# Patient Record
Sex: MALE | Race: WHITE | Employment: OTHER | URBAN - METROPOLITAN AREA
[De-identification: names, ages, dates, MRNs, and addresses within clinical notes are randomized per-mention and may not be internally consistent; named-entity substitution may affect disease eponyms.]

---

## 2021-06-02 ENCOUNTER — HOSPITAL ENCOUNTER (OUTPATIENT)
Age: 71
Discharge: HOME | End: 2021-06-04
Payer: MEDICARE

## 2021-06-02 DIAGNOSIS — Z01.818 PREOP TESTING: ICD-10-CM

## 2021-06-02 PROCEDURE — U0005 INFEC AGEN DETEC AMPLI PROBE: HCPCS

## 2021-06-02 PROCEDURE — U0003 INFECTIOUS AGENT DETECTION BY NUCLEIC ACID (DNA OR RNA); SEVERE ACUTE RESPIRATORY SYNDROME CORONAVIRUS 2 (SARS-COV-2) (CORONAVIRUS DISEASE [COVID-19]), AMPLIFIED PROBE TECHNIQUE, MAKING USE OF HIGH THROUGHPUT TECHNOLOGIES AS DESCRIBED BY CMS-2020-01-R: HCPCS

## 2021-06-03 ENCOUNTER — PREP FOR PROCEDURE (OUTPATIENT)
Dept: SURGERY | Age: 71
End: 2021-06-03

## 2021-06-03 RX ORDER — SODIUM CHLORIDE 9 MG/ML
INJECTION, SOLUTION INTRAVENOUS CONTINUOUS
Status: CANCELLED | OUTPATIENT
Start: 2021-06-03

## 2021-06-04 LAB — SARS-COV-2, PCR: NOT DETECTED

## 2021-06-04 RX ORDER — LISINOPRIL 20 MG/1
20 TABLET ORAL DAILY
COMMUNITY

## 2021-06-04 RX ORDER — IBUPROFEN 200 MG
600 TABLET ORAL EVERY 6 HOURS PRN
COMMUNITY

## 2021-06-04 NOTE — PROGRESS NOTES
[facility] PRE-ADMISSION TESTING INSTRUCTIONS    The Preadmission Testing patient is instructed accordingly using the following criteria (check applicable):    ARRIVAL INSTRUCTIONS:  [x] Parking the day of Surgery is located in the Main Entrance lot. Upon entering the door, make an immediate right to the surgery reception desk    [x] Bring photo ID and insurance card    [] Bring in a copy of Living will or Durable Power of  papers. [x] Please be sure to arrange for responsible adult to provide transportation to and from the hospital    [x] Please arrange for responsible adult to be with you for the 24 hour period post procedure due to having anesthesia      GENERAL INSTRUCTIONS:    [x] Nothing by mouth after midnight, including gum, candy, mints or water    [x] You may brush your teeth, but do not swallow any water    [x] Take medications as instructed with 1-2 oz of water    [x] Stop herbal supplements and vitamins 5 days prior to procedure    [] Follow preop dosing of blood thinners per physician instructions    [] Take 1/2 dose of evening insulin, but no insulin after midnight    [] No oral diabetic medications after midnight    [] If diabetic and have low blood sugar or feel symptomatic, take 1-2oz apple juice only    [] Bring inhalers day of surgery    [] Bring C-PAP/ Bi-Pap day of surgery    [] Bring urine specimen day of surgery    [x]   no lotion, powders or creams     [x] Follow bowel prep as instructed per surgeon    [] No tobacco products within 24 hours of surgery     [] No alcohol or illegal drug use within 24 hours of surgery.     [x] Jewelry, body piercing's, eyeglasses, contact lenses and dentures are not permitted into surgery (bring cases)      [] Please do not wear any nail polish, make up or hair products on the day of surgery    [x] You can expect a call the business day prior to procedure to notify you if your arrival time changes    [x] If you receive a survey after surgery we would greatly appreciate your comments    [] Parent/guardian of a minor must accompany their child and remain on the premises  the entire time they are under our care     [] Pediatric patients may bring favorite toy, blanket or comfort item with them    [] A caregiver or family member must remain with the patient during their stay if they are mentally handicapped, have dementia, disoriented or unable to use a call light or would be a safety concern if left unattended    [x] Please notify surgeon if you develop any illness between now and time of surgery (cold, cough, sore throat, fever, nausea, vomiting) or any signs of infections  including skin, wounds, and dental.    []  The Outpatient Pharmacy is available to fill your prescription here on your day of surgery, ask your preop nurse for details    [] Other instructions    EDUCATIONAL MATERIALS PROVIDED:    [] PAT Preoperative Education Packet/Booklet     [] Medication List    [] Transfusion bracelet applied with instructions    [] Shower with soap, lather and rinse well, and use CHG wipes provided the evening before surgery as instructed    [] Incentive spirometer with instructions

## 2021-06-04 NOTE — PROGRESS NOTES
Per patient has issues with his heart valve, follows with Dr. Radha Mcconnell, last visit was 5/25/2021. Patient states he is doing well, no issues since that appointment, called office, to fax notes.

## 2021-06-06 ENCOUNTER — ANESTHESIA EVENT (OUTPATIENT)
Dept: ENDOSCOPY | Age: 71
End: 2021-06-06
Payer: MEDICARE

## 2021-06-07 ENCOUNTER — HOSPITAL ENCOUNTER (OUTPATIENT)
Age: 71
Setting detail: OUTPATIENT SURGERY
Discharge: HOME | End: 2021-06-07
Attending: SURGERY | Admitting: SURGERY
Payer: MEDICARE

## 2021-06-07 ENCOUNTER — ANESTHESIA (OUTPATIENT)
Dept: ENDOSCOPY | Age: 71
End: 2021-06-07
Payer: MEDICARE

## 2021-06-07 VITALS
SYSTOLIC BLOOD PRESSURE: 109 MMHG | DIASTOLIC BLOOD PRESSURE: 64 MMHG | RESPIRATION RATE: 20 BRPM | OXYGEN SATURATION: 94 %

## 2021-06-07 VITALS
BODY MASS INDEX: 27.92 KG/M2 | DIASTOLIC BLOOD PRESSURE: 72 MMHG | SYSTOLIC BLOOD PRESSURE: 106 MMHG | TEMPERATURE: 97.1 F | WEIGHT: 195 LBS | RESPIRATION RATE: 20 BRPM | OXYGEN SATURATION: 92 % | HEIGHT: 70 IN | HEART RATE: 68 BPM

## 2021-06-07 DIAGNOSIS — Z01.818 PREOP TESTING: Primary | ICD-10-CM

## 2021-06-07 PROCEDURE — 3609017700 HC EGD DILATION GASTRIC/DUODENAL STRICTURE: Performed by: SURGERY

## 2021-06-07 PROCEDURE — 7100000010 HC PHASE II RECOVERY - FIRST 15 MIN: Performed by: SURGERY

## 2021-06-07 PROCEDURE — 88342 IMHCHEM/IMCYTCHM 1ST ANTB: CPT

## 2021-06-07 PROCEDURE — 7100000011 HC PHASE II RECOVERY - ADDTL 15 MIN: Performed by: SURGERY

## 2021-06-07 PROCEDURE — 6360000002 HC RX W HCPCS

## 2021-06-07 PROCEDURE — 3609010300 HC COLONOSCOPY W/BIOPSY SINGLE/MULTIPLE: Performed by: SURGERY

## 2021-06-07 PROCEDURE — 3700000001 HC ADD 15 MINUTES (ANESTHESIA): Performed by: SURGERY

## 2021-06-07 PROCEDURE — 88305 TISSUE EXAM BY PATHOLOGIST: CPT

## 2021-06-07 PROCEDURE — 2709999900 HC NON-CHARGEABLE SUPPLY: Performed by: SURGERY

## 2021-06-07 PROCEDURE — 2580000003 HC RX 258: Performed by: SURGERY

## 2021-06-07 PROCEDURE — 3700000000 HC ANESTHESIA ATTENDED CARE: Performed by: SURGERY

## 2021-06-07 RX ORDER — SODIUM CHLORIDE 9 MG/ML
INJECTION, SOLUTION INTRAVENOUS CONTINUOUS
Status: DISCONTINUED | OUTPATIENT
Start: 2021-06-07 | End: 2021-06-07 | Stop reason: HOSPADM

## 2021-06-07 RX ORDER — PROPOFOL 10 MG/ML
INJECTION, EMULSION INTRAVENOUS PRN
Status: DISCONTINUED | OUTPATIENT
Start: 2021-06-07 | End: 2021-06-07 | Stop reason: SDUPTHER

## 2021-06-07 RX ORDER — PANTOPRAZOLE SODIUM 40 MG/1
40 TABLET, DELAYED RELEASE ORAL
Qty: 30 TABLET | Refills: 5 | Status: SHIPPED | OUTPATIENT
Start: 2021-06-07

## 2021-06-07 RX ADMIN — PROPOFOL 330 MG: 10 INJECTION, EMULSION INTRAVENOUS at 06:59

## 2021-06-07 RX ADMIN — SODIUM CHLORIDE: 9 INJECTION, SOLUTION INTRAVENOUS at 06:58

## 2021-06-07 ASSESSMENT — PAIN DESCRIPTION - PROGRESSION
CLINICAL_PROGRESSION: NOT CHANGED

## 2021-06-07 ASSESSMENT — PAIN SCALES - GENERAL
PAINLEVEL_OUTOF10: 0

## 2021-06-07 ASSESSMENT — PAIN - FUNCTIONAL ASSESSMENT: PAIN_FUNCTIONAL_ASSESSMENT: 0-10

## 2021-06-07 ASSESSMENT — PAIN DESCRIPTION - PAIN TYPE
TYPE: ACUTE PAIN

## 2021-06-07 ASSESSMENT — PAIN DESCRIPTION - LOCATION
LOCATION: ABDOMEN
LOCATION: ABDOMEN;THROAT
LOCATION: THROAT;ABDOMEN

## 2021-06-07 ASSESSMENT — LIFESTYLE VARIABLES: SMOKING_STATUS: 0

## 2021-06-07 NOTE — H&P
[address]  Name: Jessica Schrader               : 1950 Sex: M  Age: 73 yrs  Acct#:  [id number]             Patient was referred by Carolina Harrell M.D..  Patient's primary care provider is Carolina Harrell M.D. .     CHIEF COMPLAINT: Colonoscopy     HPI:  53-year-old male referred for colon cancer screening. He reports a colonoscopy 15 years ago with some diverticular disease only. He denies any rectal bleeding and changes in bowel habits. He does admit to some chronic heartburn and occasional dysphagia. He reports a history of a hiatal hernia. He did have an EGD about 15 years ago as well. He is currently awaiting results of an ultrasound of his scrotum. He reports some left testicular swelling.     Meds Prior to Visit:  Lisinopril  20 mg   Carvedilol  12.5 mg   Ibuprofen  200 mg  as needed      Allergies:       PMH:  Problem List: Essential hypertension  (Health Maintenance)  Medical Problems:  Hypertension, Heart, SOB, Transfusion 2010  Surgical Hx:  Knee Replacement - (2010) BILAT/SW/HOMER  Shoulder Replacement - () LUKE/RAMSAY  Rotaor Cuff Repair - (3/2011) ELIJAHM/RAMSAY  Cataract Bi Lat - ()  Reviewed and updated. SURGERIES:[ ]  FH:  Father:  Prostate Cancer  Melanoma. Mother:  Heart Attack. Reviewed and updated. [ ]  SH:  Personal Habits:  Tobacco Use: Patient has never smoked. Alcohol: Denies alcohol use. Drug Use: Denies Drug Use. Daily Caffeine: Uses Caffeine. Reviewed and updated. [ ]     Was the patient queried about smoking behavior? Yes  No  Does the patient currently smoke? Tobacco Use: Patient has never smoked. [ ]  ROS:  Const: Reports fatigue and night sweats, but denies anorexia, anxiety, weight gain and weight loss. Eyes: Denies eye symptoms. ENMT: Denies ear symptoms. Denies nasal symptoms. Denies mouth or throat symptoms. CV: Denies hypertension and other cardiovascular symptoms. Resp: Denies respiratory symptoms.   GI: Denies occurred.   Seen by:

## 2021-06-07 NOTE — OP NOTE
[facility]  YOB: 1950  [id number]    Pre-operative Diagnosis: Heartburn, dysphagia, colon cancer screening    Post-operative Diagnosis: Grade B esophagitis, gastritis, diverticulosis, right colon polyp    Procedure: EGD with biopsies, 46 Swedish Razo dilatation, colonoscopy with polypectomy    Anesthesia: LMAC    Surgeon: Cal Moore MD    Assistant: None    Complications: none    Specimens: antrum, right colon polyp    EBL: none    Procedure:  Pt was taken to the endoscopy suite and placed on the endoscopy table in a left lateral decubitus position. LMAC anesthesia was administered and a bite block was inserted. A lubricated gastroscope was inserted into the oropharynx and advanced into the esophagus. The esophagus was inspected throughout its length. There were no varices. There was distal esophagitis, LA class B. The Z-line was at 45 cm. The stomach was entered and insufflated. The antrum was mildly inflamed. Biopsies were taken for H Pylori. The pylorus was intubated. The first and second portions of the duodenum were normal.  The scope was pulled back into the antrum and retroflexed. The angle of the stomach was normal.  The proximal greater and lesser curves were normal.  The fundus was normal.  At the GE junction, there was no evidence of a hiatal hernia. The stomach was deflated and the scope was withdrawn and removed. A 52 Swedish Razo dilator was lubricated and inserted into the esophagus without difficulty. The dilator was passed beyond the GE junction. The dilator was removed. The gastroscope was reinserted and the esophagus was inspected without evidence of complicating features. The scope was removed. Next, a digital rectal examination revealed no gross blood, normal tone, no mass was palpated. A lubricated colonoscope was inserted and advanced to the cecum without difficulty.  The ileocecal valve and appendiceal orifice were identified and photographed. Prep was good. Cecum, ascending colon, hepatic flexure, transverse colon, splenic flexure, descending colon, sigmoid colon were all extensively inspected. There was a small polyp in the right colon which was easily removed with a simple biopsy forceps. Diverticular disease was noted throughout the colon, but most heavily concentrated in the sigmoid. The proximal rectum was normal. The distal rectum was normal. The colon was deflated. The scope was removed. The patient tolerated the procedure well. Impression: Mild gastritis, grade B esophagitis. Diverticular disease. Small right colon polyp    Plan: Check pathology. Proton pump inhibitor. Repeat colonoscopy 5 years pending pathology.   High-fiber, antireflux diet      Latasha Bourgeois MD, MD 6/7/2021, 5:11 AM

## 2021-06-07 NOTE — PROGRESS NOTES
PT RECEIVED IN PACU 2 POST UPPER ENDOSCOPY WITH DILATION AND COLONOSCOPY , REPORT RECEIVED ASSESSMENT AND VS OB TAINED , PT NOT PASSED ANY FLATUS AT THIS TIME WILL MONITOR

## 2021-06-07 NOTE — PROGRESS NOTES
PT PASSING FLATUS AND TAKING WATER NO DIFFICULTY  0750 DISCHARGE INSTRUCTIONS GIVEN TO PT AND HIS WIFE AT THIS TIME BOTH VERBALIZED UNDERSTANDING OF INSTRUCTIONS PAMPHLETS GIVEN, AWARE TO SCHEDULE FOLLOW UP APPOINTMENT WITH DR Logan Starr AND START PRESCRIPTION

## 2021-06-07 NOTE — ANESTHESIA POSTPROCEDURE EVALUATION
Department of Anesthesiology  Postprocedure Note    Patient: Navin Rice  MRN: [id number]  [place]: 1950  Date of evaluation: 6/7/2021  Time:  9:07 AM     Procedure Summary     Date: 06/07/21 Room / Location: [address] [place] [address]    Anesthesia Start: 4156 Anesthesia Stop: 0720    Procedures:       EGD BIOPSY and dilatation (N/A )      COLONOSCOPY WITH BIOPSY (N/A ) Diagnosis: (HEART BURN AND SCREENING)    Surgeons: Jenni Rendon MD Responsible Provider: Michelle Arrieta MD    Anesthesia Type: MAC ASA Status: 3          Anesthesia Type: MAC    Delaney Phase I: Delaney Score: 10    Delaney Phase II: Delaney Score: 10    Last vitals: Reviewed and per EMR flowsheets.        Anesthesia Post Evaluation    Patient location during evaluation: PACU  Patient participation: complete - patient participated  Level of consciousness: awake and alert  Airway patency: patent  Nausea & Vomiting: no vomiting and no nausea  Complications: no  Cardiovascular status: blood pressure returned to baseline  Respiratory status: acceptable  Hydration status: euvolemic

## 2021-06-07 NOTE — ANESTHESIA PRE PROCEDURE
SURGERY Right 1969    cartilage removal    ROTATOR CUFF REPAIR Right 2015    TONSILLECTOMY  child       Social History:    Social History     Tobacco Use    Smoking status: Never Smoker    Smokeless tobacco: Never Used   Substance Use Topics    Alcohol use: Not Currently                                Counseling given: Not Answered      Vital Signs (Current):   Vitals:    06/04/21 1144   Weight: 195 lb (88.5 kg)   Height: 5' 10\" (1.778 m)                                              BP Readings from Last 3 Encounters:   No data found for BP       NPO Status:                                                                                 BMI:   Wt Readings from Last 3 Encounters:   06/04/21 195 lb (88.5 kg)     Body mass index is 27.98 kg/m². CBC:   Lab Results   Component Value Date    WBC 6.6 12/16/2010    RBC 2.68 12/16/2010    HGB 7.4 12/16/2010    HCT 21.6 12/16/2010    MCV 80.6 12/16/2010    RDW 12.5 12/16/2010     12/16/2010       CMP: No results found for: NA, K, CL, CO2, BUN, CREATININE, GFRAA, AGRATIO, LABGLOM, GLUCOSE, PROT, CALCIUM, BILITOT, ALKPHOS, AST, ALT    POC Tests: No results for input(s): POCGLU, POCNA, POCK, POCCL, POCBUN, POCHEMO, POCHCT in the last 72 hours.     Coags:   Lab Results   Component Value Date    PROTIME 12.0 12/02/2010    INR 1.2 12/02/2010    APTT 37.0 12/02/2010       HCG (If Applicable): No results found for: PREGTESTUR, PREGSERUM, HCG, HCGQUANT     ABGs: No results found for: PHART, PO2ART, ERH3ZYD, IFG2NEJ, BEART, H4UKUOXV     Type & Screen (If Applicable):  No results found for: LABABO, LABRH    Drug/Infectious Status (If Applicable):  No results found for: HIV, HEPCAB    COVID-19 Screening (If Applicable):   Lab Results   Component Value Date    COVID19 Not Detected 06/02/2021           Anesthesia Evaluation  Patient summary reviewed and Nursing notes reviewed no history of anesthetic complications:   Airway: Mallampati: II  TM distance: >3 FB   Neck ROM: full Mouth opening: > = 3 FB Dental: normal exam         Pulmonary:Negative Pulmonary ROS breath sounds clear to auscultation      (-) not a current smoker                           Cardiovascular:  Exercise tolerance: good (>4 METS),   (+) hypertension:, valvular problems/murmurs:,         Rhythm: regular  Rate: normal           Beta Blocker:  Dose within 24 Hrs         Neuro/Psych:                ROS comment: Neck stiffness GI/Hepatic/Renal:   (+) GERD: poorly controlled,          ROS comment: dysphagia. Endo/Other:    (+) : arthritis: OA., malignancy/cancer. Abdominal:           Vascular: negative vascular ROS. Anesthesia Plan      MAC     ASA 3       Induction: intravenous. Anesthetic plan and risks discussed with patient and spouse.                     Kimmy Maynard MD   6/7/2021

## 2024-12-03 ENCOUNTER — HOSPITAL ENCOUNTER (OUTPATIENT)
Age: 74
Discharge: HOME | End: 2024-12-05

## 2024-12-10 ENCOUNTER — HOSPITAL ENCOUNTER (OUTPATIENT)
Age: 74
Discharge: HOME | End: 2024-12-12

## 2024-12-10 LAB
ABO + RH BLD: NORMAL
ARM BAND NUMBER: NORMAL
BLOOD BANK SAMPLE EXPIRATION: NORMAL
BLOOD GROUP ANTIBODIES SERPL: NEGATIVE

## 2024-12-10 PROCEDURE — 86901 BLOOD TYPING SEROLOGIC RH(D): CPT

## 2024-12-10 PROCEDURE — 86900 BLOOD TYPING SEROLOGIC ABO: CPT

## 2024-12-10 PROCEDURE — 86850 RBC ANTIBODY SCREEN: CPT

## 2024-12-11 ENCOUNTER — HOSPITAL ENCOUNTER (OUTPATIENT)
Age: 74
Discharge: HOME | End: 2024-12-13

## 2024-12-11 LAB
ANION GAP SERPL CALCULATED.3IONS-SCNC: 8 MMOL/L (ref 7–16)
BUN SERPL-MCNC: 15 MG/DL (ref 6–23)
CALCIUM SERPL-MCNC: 8.4 MG/DL (ref 8.6–10.2)
CHLORIDE SERPL-SCNC: 104 MMOL/L (ref 98–107)
CO2 SERPL-SCNC: 25 MMOL/L (ref 22–29)
CREAT SERPL-MCNC: 0.9 MG/DL (ref 0.7–1.2)
ERYTHROCYTE [DISTWIDTH] IN BLOOD BY AUTOMATED COUNT: 12.9 % (ref 11.5–15)
GFR, ESTIMATED: 87 ML/MIN/1.73M2
GLUCOSE SERPL-MCNC: 97 MG/DL (ref 74–99)
HCT VFR BLD AUTO: 37.3 % (ref 37–54)
HGB BLD-MCNC: 12.5 G/DL (ref 12.5–16.5)
MCH RBC QN AUTO: 28.2 PG (ref 26–35)
MCHC RBC AUTO-ENTMCNC: 33.5 G/DL (ref 32–34.5)
MCV RBC AUTO: 84.2 FL (ref 80–99.9)
PLATELET # BLD AUTO: 138 K/UL (ref 130–450)
PMV BLD AUTO: 11.6 FL (ref 7–12)
POTASSIUM SERPL-SCNC: 4.5 MMOL/L (ref 3.5–5)
RBC # BLD AUTO: 4.43 M/UL (ref 3.8–5.8)
SODIUM SERPL-SCNC: 137 MMOL/L (ref 132–146)
WBC OTHER # BLD: 9.6 K/UL (ref 4.5–11.5)

## 2024-12-11 PROCEDURE — 80048 BASIC METABOLIC PNL TOTAL CA: CPT

## 2024-12-11 PROCEDURE — 85027 COMPLETE CBC AUTOMATED: CPT

## (undated) DEVICE — FORCEPS BX OVL CUP FEN DISPOSABLE CAP L 160CM RAD JAW 4

## (undated) DEVICE — FORCEPS BX L240CM JAW DIA2.4MM ORNG L CAP W/ NDL DISP RAD

## (undated) DEVICE — SPONGE GZ W4XL4IN RAYON POLY FILL CVR W/ NONWOVEN FAB

## (undated) DEVICE — BOUGIE SURG 52FR TIP L17.5MM SIL TAPR MLNY TIP TUNGSTEN

## (undated) DEVICE — GRADUATE TRIANG MEASURE 1000ML BLK PRNT

## (undated) DEVICE — BLOCK BITE 60FR RUBBER ADLT DENTAL